# Patient Record
Sex: FEMALE | Race: ASIAN | NOT HISPANIC OR LATINO | Employment: STUDENT | URBAN - METROPOLITAN AREA
[De-identification: names, ages, dates, MRNs, and addresses within clinical notes are randomized per-mention and may not be internally consistent; named-entity substitution may affect disease eponyms.]

---
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
## 2024-06-29 ENCOUNTER — HOSPITAL ENCOUNTER (EMERGENCY)
Facility: CLINIC | Age: 19
Discharge: HOME | End: 2024-06-29
Attending: EMERGENCY MEDICINE | Admitting: EMERGENCY MEDICINE
Payer: COMMERCIAL

## 2024-06-29 ENCOUNTER — APPOINTMENT (OUTPATIENT)
Dept: RADIOLOGY | Facility: CLINIC | Age: 19
End: 2024-06-29
Attending: EMERGENCY MEDICINE
Payer: COMMERCIAL

## 2024-06-29 VITALS
SYSTOLIC BLOOD PRESSURE: 103 MMHG | OXYGEN SATURATION: 100 % | HEART RATE: 125 BPM | BODY MASS INDEX: 23.04 KG/M2 | RESPIRATION RATE: 28 BRPM | TEMPERATURE: 98 F | WEIGHT: 130 LBS | DIASTOLIC BLOOD PRESSURE: 59 MMHG | HEIGHT: 63 IN

## 2024-06-29 DIAGNOSIS — J40 BRONCHITIS: ICD-10-CM

## 2024-06-29 LAB
FLUAV RNA SPEC QL NAA+PROBE: NEGATIVE
FLUBV RNA RESP QL NAA+PROBE: NEGATIVE
HCG SERPL QL: NEGATIVE
RSV RNA SPEC NAA+PROBE: NEGATIVE
SARS-COV-2 RNA RESP QL NAA+PROBE: NEGATIVE

## 2024-06-29 PROCEDURE — 36415 COLL VENOUS BLD VENIPUNCTURE: CPT | Performed by: EMERGENCY MEDICINE

## 2024-06-29 PROCEDURE — 84703 CHORIONIC GONADOTROPIN ASSAY: CPT | Performed by: EMERGENCY MEDICINE

## 2024-06-29 PROCEDURE — 87637 SARSCOV2&INF A&B&RSV AMP PRB: CPT | Performed by: EMERGENCY MEDICINE

## 2024-06-29 PROCEDURE — 99284 EMERGENCY DEPT VISIT MOD MDM: CPT | Mod: 25

## 2024-06-29 PROCEDURE — 71045 X-RAY EXAM CHEST 1 VIEW: CPT

## 2024-06-29 RX ORDER — LORAZEPAM 2 MG/ML
1 INJECTION INTRAMUSCULAR ONCE
Status: DISCONTINUED | OUTPATIENT
Start: 2024-06-29 | End: 2024-06-29

## 2024-06-29 ASSESSMENT — COLUMBIA-SUICIDE SEVERITY RATING SCALE - C-SSRS
2. HAVE YOU ACTUALLY HAD ANY THOUGHTS OF KILLING YOURSELF IN THE PAST MONTH?: NO
1. IN THE PAST MONTH, HAVE YOU WISHED YOU WERE DEAD OR WISHED YOU COULD GO TO SLEEP AND NOT WAKE UP?: NO
6. HAVE YOU EVER DONE ANYTHING, STARTED TO DO ANYTHING, OR PREPARED TO DO ANYTHING TO END YOUR LIFE?: YES

## 2024-06-29 ASSESSMENT — ACTIVITIES OF DAILY LIVING (ADL)
ADLS_ACUITY_SCORE: 35
ADLS_ACUITY_SCORE: 35

## 2024-06-29 NOTE — DISCHARGE INSTRUCTIONS
You are seen in the emergency department for coughing.  Your evaluation included an x-ray and COVID test.  This was negative for pneumonia, COVID, or influenza.  As we discussed we do think your symptoms are consistent with a viral upper respiratory illness.  You can continue on the antibiotic previously prescribed.  You can continue taking Tylenol and ibuprofen as needed for   Pain or fever.  Please try to encourage plenty of liquids and take to stay well-hydrated.  Try to follow-up in your clinic within a week or 2 after this emergency department visit to review any ongoing symptoms.

## 2024-06-29 NOTE — ED TRIAGE NOTES
Pt comes in with c/o mid abdominal pain. Pt says the pain woke her up from sleep. Very tearful and was hyperventilating.      Triage Assessment (Adult)       Row Name 06/29/24 0658          Triage Assessment    Airway WDL WDL        Respiratory WDL    Respiratory WDL WDL        Skin Circulation/Temperature WDL    Skin Circulation/Temperature WDL WDL        Cardiac WDL    Cardiac WDL X;rhythm     Pulse Rate & Regularity tachycardic        Peripheral/Neurovascular WDL    Peripheral Neurovascular WDL WDL        Cognitive/Neuro/Behavioral WDL    Cognitive/Neuro/Behavioral WDL WDL;mood/behavior     Mood/Behavior anxious

## 2024-06-29 NOTE — ED PROVIDER NOTES
EMERGENCY DEPARTMENT ENCOUNTER      NAME: Kathie Landaverde  AGE: 19 year old female  YOB: 2005  MRN: [id number]  EVALUATION DATE & TIME: 2024  6:55 AM    PCP: Emmett Cooney    ED PROVIDER: Lalito López M.D.      Chief Complaint   Patient presents with    Abdominal Pain         FINAL IMPRESSION:  1. Bronchitis          ED COURSE & MEDICAL DECISION MAKIN:03 AM I introduced myself to the patient, obtained patient history, performed a physical exam, and discussed plan for ED workup including potential diagnostic laboratory/imaging studies and interventions.  7:28 AM Rechecked the patient.  8:38 AM Rechecked and updated the patient. We discussed the plan for discharge and the patient is agreeable. Reviewed supportive cares, symptomatic treatment, outpatient follow up, and reasons to return to the Emergency Department. Patient to be discharged by ED RN.     19 year old female presents to the Emergency Department for evaluation of cough and shortness of breath.  Patient is  very anxious appearing when she arrives, hyperventilating with some associated tachycardia.  She has had upper respiratory symptoms, mostly focused on sore throat over the last several days with an increase in cough this morning.  She was started on azithromycin a couple of days ago after visit to urgent care.  she is maintaining oxygen saturations in the upper 90s on room air and her lung sounds are clear had initially ordered some lab evaluations, IVF and ativan, however patient fairly quickly was able to calm down, regulate breathing, and reported significant improvement fairly quickly here.  She wanted to avoid any potentially unnecessary testing and declined other lab evaluations here.  We did complete a chest x-ray which looked clear of infiltrates.  This was independently reviewed and interpreted.  COVID and influenza testing was negative.  Patient's tachycardia did resolve although this did not get redocumented  prior to discharge.  She was resting much more comfortably on recheck.   she can finish her course of azithromycin although I think her symptoms are more suggestive of a viral pharyngitis/bronchitis at this time.  She was comfortable otherwise with continuing his supportive treatment plan and following up with clinic to review any ongoing symptoms.    At the conclusion of the encounter I discussed the results of all of the tests and the disposition. The questions were answered. The patient or family acknowledged understanding and was agreeable with the care plan.       Medical Decision Making  Obtained supplemental history:Supplemental history obtained?: No  Reviewed external records: External records reviewed?: Outpatient Record: Urgent Care 6/27/24  Care impacted by chronic illness:Mental Health  Care significantly affected by social determinants of health:N/A  Did you consider but not order tests?: Work up considered but not performed and documented in chart, if applicable  Did you interpret images independently?: Independent interpretation of ECG and images noted in documentation, when applicable.  Consultation discussion with other provider:Did you involve another provider (consultant, , pharmacy, etc.)?: No  Discharge. No recommendations on prescription strength medication(s). N/A.        MEDICATIONS GIVEN IN THE EMERGENCY:  Medications - No data to display      NEW PRESCRIPTIONS STARTED AT TODAY'S ER VISIT  Discharge Medication List as of 6/29/2024  8:48 AM             =================================================================    HPI    Patient information was obtained from: Patient    Use of : N/A       Kathie Landaverde is a 19 year old female with a pertinent history of anxiety who presents to this ED by walk in for evaluation of abdominal pain.    Per chart review, the patient was seen at [facility] 6/27/24 presenting with several days of cough, sore throat, sneezing, and  a headache. Strep saw was negative. Patient prescribed course of azithromycin.    The patient reports mid abdominal pain that woke her up this morning. Shortly after waking up the patient began to have a coughing fit that lasted for about 10 minutes. Since the coughing fit she has had ongoing shortness of breath and has continued to have abdominal pain. She denies any history of similar symptoms and denies any history of pulmonary issues.    REVIEW OF SYSTEMS   All systems reviewed and negative except as noted in HPI.    PAST MEDICAL HISTORY:  History reviewed. No pertinent past medical history.    PAST SURGICAL HISTORY:  History reviewed. No pertinent surgical history.        CURRENT MEDICATIONS:    No current facility-administered medications for this encounter.     Current Outpatient Medications   Medication Sig Dispense Refill    FLUoxetine (PROZAC) 10 MG tablet Take 3 tablets (30 mg) by mouth daily 90 tablet 0    QUEtiapine (SEROQUEL XR) 150 MG TB24 24 hr tablet Take 1 tablet (150 mg) by mouth At Bedtime 30 tablet 0         ALLERGIES:  No Known Allergies    FAMILY HISTORY:  History reviewed. No pertinent family history.    SOCIAL HISTORY:   Social History     Socioeconomic History    Marital status: Single   Tobacco Use    Smoking status: Never    Smokeless tobacco: Never   Substance and Sexual Activity    Alcohol use: Never     Social Determinants of Health     Financial Resource Strain: Low Risk  (8/18/2023)    Received from [employer]    Financial Resource Strain     Difficulty of Paying Living Expenses: 3   Food Insecurity: No Food Insecurity (8/18/2023)    Received from [facility]    Food Insecurity     Worried About Running Out of Food in the Last Year: 1   Transportation Needs: No Transportation Needs (8/18/2023)    Received from [facility]    Transportation Needs     Lack of Transportation (Medical): 1  "  Social Connections: Socially Integrated (8/18/2023)    Received from [facility]    Social Connections     Frequency of Communication with Friends and Family: 0   Housing Stability: Low Risk  (8/18/2023)    Received from [facility]    Housing Stability     Unable to Pay for Housing in the Last Year: 1       VITALS:  /59   Pulse (!) 125   Temp 98  F (36.7  C) (Oral)   Resp 28   Ht 1.6 m (5' 3\")   Wt 59 kg (130 lb)   SpO2 100%   BMI 23.03 kg/m      PHYSICAL EXAM    Constitutional: Well developed, Well nourished, anxious appearing but nontoxic.  HENT: Normocephalic, Atraumatic. Neck Supple.  Eyes: EOMI, Conjunctiva normal.  Respiratory: Breathing comfortably on room air.  Lungs clear to ascultation.  Cardiovascular: Tachycardic heart rate, Regular rhythm. No peripheral edema.  Abdomen: Soft, nontender  Musculoskeletal: Good range of motion in all major joints. No major deformities noted.  Integument: Warm, Dry.  Neurologic: Alert & awake, Normal motor function, Normal sensory function, No focal deficits noted.   Psychiatric: Cooperative. Anxious and hyperventilating.     LAB:  All pertinent labs reviewed and interpreted.  Labs Ordered and Resulted from Time of ED Arrival to Time of ED Departure   INFLUENZA A/B, RSV, & SARS-COV2 PCR - Normal       Result Value    Influenza A PCR Negative      Influenza B PCR Negative      RSV PCR Negative      SARS CoV2 PCR Negative     HCG QUALITATIVE PREGNANCY - Normal    hCG Serum Qualitative Negative         RADIOLOGY:  Reviewed all pertinent imaging. Please see official radiology report.  XR Chest Port 1 View   Final Result   IMPRESSION: Negative chest.            I, Dragan Bowling, am serving as a scribe to document services personally performed by Dr. Lalito López, based on my observation and the provider's statements to me. Lalito GARAY MD attest that Dragan Bowling is acting in a " [facility], has observed my performance of the services and has documented them in accordance with my direction.    Lalito López M.D.  Emergency Medicine  [facility] EMERGENCY ROOM  [address] [facility] [address]  [phone]  Dept: [phone]     Lalito López MD  06/29/24 0057